# Patient Record
Sex: MALE | Race: WHITE
[De-identification: names, ages, dates, MRNs, and addresses within clinical notes are randomized per-mention and may not be internally consistent; named-entity substitution may affect disease eponyms.]

---

## 2019-06-14 NOTE — NUR
PACU RN | PATIENT TRANSFERRED TO SURGICAL FLOOR
 
This RN took pt to surgical floor. Helped get patient settled into room.
Patient was A/O. Requesting fluids. Denied pain. SCD's on. Polar pack in room.
Patient placed on 1 L NC. Sats > 92%. Advised Jody RN of TXA dose due now.
Helped prep TXA for infusion. Receiving RN denied further questions or needs.

## 2019-06-14 NOTE — NUR
INTO SDS VIA BED. PT REPORTS 2/10 LEFT HIP PAIN AT REST THAT INCREASED TO 6/10
WITH ANY MOVEMENT.  History, Chart, Medications and Allergies reviewed before
start of procedure.Lungs clear T/O to Auscultation.SATS>90% ON 2 LITERS NASAL
CANULA.  Patient confirms NPO status and agrees with scheduled surgery.NO PREP
TO LEFT HIP DUE TO PT PAIN.

## 2019-06-14 NOTE — NUR
SHIFT SUMMARY
PT NEW ADMIT THIS SHIFT. AAOX4. NPO. DISCOMFORT CONTROLLED WITH 1MG IV
DILAUDID. NO NAUSEA/EMESIS. PPP, PT DENIES N/T BLE, MOVES TOES WELL, BRISK CAP
REFILL. PT REPOSITION FOR COMFORT THIS SHIFT. QUESTIONS ANSWERED REGARDING TX.
ORIENTED TO ROOM + CALL LIGHT USE + PT DEMONSTRATED USE OF CALL LIGHT. ORTHO
CONSULT CALLED. PT RESTING THIS AM. WILL CONTINUE TO MONITOR.

## 2019-06-15 NOTE — NUR
SHIFT SUMMARY
PAIN HAS BEEN MANAGED WITH PO PAIN MEDICATION, PT REPORTS PAIN IS MINIMAL.  PT
WORKED WITH THERAPY TODAY.  POSSIBLE HOME TOMORROW.  CALCIUM WAS LOW TODAY AND
WAS REPLACED.  VSS.  WILL MONITOR UNTIL REPORT TO ONCOMING RN.

## 2019-06-15 NOTE — NUR
SUMMARY
POD #1
AQUACEL DRSG REMAINS C/D/I. CIRC WNL. PT HAS BEEN UP TO THE BATHROOM X1 WITH 1
PERSON ASSIST, FWW, & GAIT BELT. PAIN MANAGED WITH TYLENOL & TORADOL.
TOLERATING PO INTAKE. LR INFUSING @ 125 ML/HR. REPORT GIVEN TO DAY RN. CALL
LIGHT IN REACH.

## 2019-06-16 NOTE — NUR
SHIFT SUMMARY
 
RESTED WELL, ABLE TO REPOSITION SELF IN BED.  DENEIS PAIN OR DISCOMFORT AT
THIS TIME.  DRESSING TO LEFT HIP IS C/D/I.  DENIES FURTHER NEEDS AT THIS TIME.
SAFETY MEASURES IN PLACE.  WILL CONTINUE TO MONITOR.

## 2019-06-16 NOTE — NUR
DISCHARGE
PT PROVIDED WITH WRITTEN AND VERBAL DISCHARGE INSTRUCTIONS.  PT REPORTED
UNDERSTANDING AFTER QUESTIONS WERE ANSWERED.  PT ESCORTED OUT IN W/C BY ANABELL MCKINLEY.  WILL CONTINUE TO MONITOR.

## 2020-03-16 ENCOUNTER — HOSPITAL ENCOUNTER (OUTPATIENT)
Dept: HOSPITAL 95 - LAB | Age: 75
End: 2020-03-16
Attending: INTERNAL MEDICINE
Payer: MEDICARE

## 2020-03-16 DIAGNOSIS — I10: Primary | ICD-10-CM

## 2020-03-16 DIAGNOSIS — N20.0: ICD-10-CM

## 2020-03-16 LAB
CREAT UR-MCNC: 119 MG/DL (ref 27–270)
PROT UR-MCNC: 16 MG/DL (ref 0–11.9)
RBC #/AREA URNS HPF: (no result) /HPF (ref 0–2)
WBC #/AREA URNS HPF: (no result) /HPF (ref 0–5)

## 2020-12-09 ENCOUNTER — HOSPITAL ENCOUNTER (EMERGENCY)
Dept: HOSPITAL 95 - ER | Age: 75
Discharge: HOME | End: 2020-12-09
Payer: MEDICARE

## 2020-12-09 VITALS — BODY MASS INDEX: 25.2 KG/M2 | WEIGHT: 180.01 LBS | HEIGHT: 71 IN

## 2020-12-09 DIAGNOSIS — Z79.82: ICD-10-CM

## 2020-12-09 DIAGNOSIS — Z79.899: ICD-10-CM

## 2020-12-09 DIAGNOSIS — E86.0: Primary | ICD-10-CM

## 2020-12-09 DIAGNOSIS — Z87.891: ICD-10-CM

## 2020-12-09 DIAGNOSIS — Z88.5: ICD-10-CM

## 2020-12-09 DIAGNOSIS — Z86.73: ICD-10-CM

## 2020-12-09 DIAGNOSIS — I10: ICD-10-CM

## 2020-12-09 DIAGNOSIS — Z79.01: ICD-10-CM

## 2020-12-09 LAB — PROTHROMBIN TIME: 18.8 SEC (ref 9.7–11.5)

## 2020-12-10 ENCOUNTER — HOSPITAL ENCOUNTER (EMERGENCY)
Dept: HOSPITAL 95 - ER | Age: 75
Discharge: HOME | End: 2020-12-10
Payer: MEDICARE

## 2020-12-10 VITALS — HEIGHT: 71 IN | BODY MASS INDEX: 25.2 KG/M2 | WEIGHT: 180.01 LBS

## 2020-12-10 DIAGNOSIS — Z86.718: ICD-10-CM

## 2020-12-10 DIAGNOSIS — I10: ICD-10-CM

## 2020-12-10 DIAGNOSIS — Z79.899: ICD-10-CM

## 2020-12-10 DIAGNOSIS — C85.91: Primary | ICD-10-CM

## 2020-12-10 DIAGNOSIS — Z88.5: ICD-10-CM

## 2020-12-10 DIAGNOSIS — Z79.01: ICD-10-CM

## 2020-12-10 DIAGNOSIS — R13.10: ICD-10-CM

## 2020-12-10 DIAGNOSIS — E86.0: ICD-10-CM

## 2020-12-10 LAB
ANION GAP SERPL CALCULATED.4IONS-SCNC: 5 MMOL/L (ref 6–16)
BUN SERPL-MCNC: 18 MG/DL (ref 8–24)
CALCIUM SERPL-MCNC: 8.2 MG/DL (ref 8.5–10.1)
CHLORIDE SERPL-SCNC: 107 MMOL/L (ref 98–108)
CO2 SERPL-SCNC: 27 MMOL/L (ref 21–32)
CREAT SERPL-MCNC: 1.12 MG/DL (ref 0.6–1.2)
GLUCOSE SERPL-MCNC: 101 MG/DL (ref 70–99)
POTASSIUM SERPL-SCNC: 4.4 MMOL/L (ref 3.5–5.5)
PROTHROMBIN TIME: 20.7 SEC (ref 9.7–11.5)
SODIUM SERPL-SCNC: 139 MMOL/L (ref 136–145)

## 2021-01-04 ENCOUNTER — HOSPITAL ENCOUNTER (OUTPATIENT)
Dept: HOSPITAL 95 - LAB SHORT | Age: 76
End: 2021-01-04
Payer: MEDICARE

## 2021-01-04 DIAGNOSIS — L08.9: ICD-10-CM

## 2021-01-04 DIAGNOSIS — R23.3: ICD-10-CM

## 2021-01-04 DIAGNOSIS — D48.5: Primary | ICD-10-CM

## 2021-01-11 ENCOUNTER — HOSPITAL ENCOUNTER (OUTPATIENT)
Dept: HOSPITAL 95 - LAB SHORT | Age: 76
End: 2021-01-11
Attending: PATHOLOGY
Payer: MEDICARE

## 2021-01-11 DIAGNOSIS — C83.33: Primary | ICD-10-CM

## 2021-01-23 ENCOUNTER — HOSPITAL ENCOUNTER (EMERGENCY)
Dept: HOSPITAL 95 - ER | Age: 76
Discharge: HOME | End: 2021-01-23
Payer: MEDICARE

## 2021-01-23 VITALS — BODY MASS INDEX: 23.1 KG/M2 | WEIGHT: 164.99 LBS | HEIGHT: 71 IN

## 2021-01-23 DIAGNOSIS — R55: Primary | ICD-10-CM

## 2021-01-23 DIAGNOSIS — Z79.01: ICD-10-CM

## 2021-01-23 DIAGNOSIS — Z79.899: ICD-10-CM

## 2021-01-23 LAB
ALBUMIN SERPL BCP-MCNC: 3.5 G/DL (ref 3.4–5)
ALBUMIN/GLOB SERPL: 1 {RATIO} (ref 0.8–1.8)
ALT SERPL W P-5'-P-CCNC: 28 U/L (ref 12–78)
ANION GAP SERPL CALCULATED.4IONS-SCNC: 6 MMOL/L (ref 6–16)
AST SERPL W P-5'-P-CCNC: 26 U/L (ref 12–37)
BASOPHILS # BLD AUTO: 0.03 K/MM3 (ref 0–0.23)
BASOPHILS NFR BLD AUTO: 1 % (ref 0–2)
BILIRUB SERPL-MCNC: 1.2 MG/DL (ref 0.1–1)
BUN SERPL-MCNC: 30 MG/DL (ref 8–24)
CALCIUM SERPL-MCNC: 8.2 MG/DL (ref 8.5–10.1)
CHLORIDE SERPL-SCNC: 103 MMOL/L (ref 98–108)
CO2 SERPL-SCNC: 28 MMOL/L (ref 21–32)
CREAT SERPL-MCNC: 1.75 MG/DL (ref 0.6–1.2)
DEPRECATED RDW RBC AUTO: 43.3 FL (ref 35.1–46.3)
EOSINOPHIL # BLD AUTO: 0 K/MM3 (ref 0–0.68)
EOSINOPHIL NFR BLD AUTO: 0 % (ref 0–6)
ERYTHROCYTE [DISTWIDTH] IN BLOOD BY AUTOMATED COUNT: 13.1 % (ref 11.7–14.2)
GLOBULIN SER CALC-MCNC: 3.6 G/DL (ref 2.2–4)
GLUCOSE SERPL-MCNC: 104 MG/DL (ref 70–99)
HCT VFR BLD AUTO: 39.1 % (ref 37–53)
HGB BLD-MCNC: 13 G/DL (ref 13.5–17.5)
IMM GRANULOCYTES # BLD AUTO: 0.01 K/MM3 (ref 0–0.1)
IMM GRANULOCYTES NFR BLD AUTO: 0 % (ref 0–1)
LYMPHOCYTES # BLD AUTO: 0.25 K/MM3 (ref 0.84–5.2)
LYMPHOCYTES NFR BLD AUTO: 6 % (ref 21–46)
MAGNESIUM SERPL-MCNC: 2.1 MG/DL (ref 1.6–2.4)
MCHC RBC AUTO-ENTMCNC: 33.2 G/DL (ref 31.5–36.5)
MCV RBC AUTO: 91 FL (ref 80–100)
MONOCYTES # BLD AUTO: 0.44 K/MM3 (ref 0.16–1.47)
MONOCYTES NFR BLD AUTO: 10 % (ref 4–13)
NEUTROPHILS # BLD AUTO: 3.54 K/MM3 (ref 1.96–9.15)
NEUTROPHILS NFR BLD AUTO: 83 % (ref 41–73)
NRBC # BLD AUTO: 0 K/MM3 (ref 0–0.02)
NRBC BLD AUTO-RTO: 0 /100 WBC (ref 0–0.2)
PLATELET # BLD AUTO: 178 K/MM3 (ref 150–400)
POTASSIUM SERPL-SCNC: 4.7 MMOL/L (ref 3.5–5.5)
PROT SERPL-MCNC: 7.1 G/DL (ref 6.4–8.2)
PROTHROMBIN TIME: 21.9 SEC (ref 9.7–11.5)
SODIUM SERPL-SCNC: 137 MMOL/L (ref 136–145)
TROPONIN I SERPL-MCNC: <0.015 NG/ML (ref 0–0.04)

## 2021-01-28 ENCOUNTER — HOSPITAL ENCOUNTER (INPATIENT)
Dept: HOSPITAL 95 - ER | Age: 76
LOS: 3 days | Discharge: HOME | DRG: 193 | End: 2021-01-31
Attending: HOSPITALIST | Admitting: HOSPITALIST
Payer: MEDICARE

## 2021-01-28 VITALS — WEIGHT: 163.36 LBS | BODY MASS INDEX: 22.87 KG/M2 | HEIGHT: 70.98 IN

## 2021-01-28 DIAGNOSIS — Z86.718: ICD-10-CM

## 2021-01-28 DIAGNOSIS — Z87.891: ICD-10-CM

## 2021-01-28 DIAGNOSIS — Z79.899: ICD-10-CM

## 2021-01-28 DIAGNOSIS — J96.01: ICD-10-CM

## 2021-01-28 DIAGNOSIS — N18.30: ICD-10-CM

## 2021-01-28 DIAGNOSIS — J18.9: Primary | ICD-10-CM

## 2021-01-28 DIAGNOSIS — M48.54XD: ICD-10-CM

## 2021-01-28 DIAGNOSIS — N17.9: ICD-10-CM

## 2021-01-28 DIAGNOSIS — I12.9: ICD-10-CM

## 2021-01-28 DIAGNOSIS — C83.38: ICD-10-CM

## 2021-01-28 DIAGNOSIS — L40.9: ICD-10-CM

## 2021-01-28 DIAGNOSIS — Z20.822: ICD-10-CM

## 2021-01-28 DIAGNOSIS — R13.10: ICD-10-CM

## 2021-01-28 DIAGNOSIS — Z79.01: ICD-10-CM

## 2021-01-28 DIAGNOSIS — R55: ICD-10-CM

## 2021-01-28 LAB
ALBUMIN SERPL BCP-MCNC: 3 G/DL (ref 3.4–5)
ALBUMIN/GLOB SERPL: 0.8 {RATIO} (ref 0.8–1.8)
ALT SERPL W P-5'-P-CCNC: 36 U/L (ref 12–78)
ANION GAP SERPL CALCULATED.4IONS-SCNC: 7 MMOL/L (ref 6–16)
AST SERPL W P-5'-P-CCNC: 48 U/L (ref 12–37)
BASOPHILS # BLD AUTO: 0.02 K/MM3 (ref 0–0.23)
BASOPHILS NFR BLD AUTO: 0 % (ref 0–2)
BILIRUB SERPL-MCNC: 1 MG/DL (ref 0.1–1)
BUN SERPL-MCNC: 32 MG/DL (ref 8–24)
CALCIUM SERPL-MCNC: 8.7 MG/DL (ref 8.5–10.1)
CHLORIDE SERPL-SCNC: 102 MMOL/L (ref 98–108)
CO2 SERPL-SCNC: 27 MMOL/L (ref 21–32)
CREAT SERPL-MCNC: 1.57 MG/DL (ref 0.6–1.2)
DEPRECATED RDW RBC AUTO: 42.2 FL (ref 35.1–46.3)
EOSINOPHIL # BLD AUTO: 0 K/MM3 (ref 0–0.68)
EOSINOPHIL NFR BLD AUTO: 0 % (ref 0–6)
ERYTHROCYTE [DISTWIDTH] IN BLOOD BY AUTOMATED COUNT: 12.9 % (ref 11.7–14.2)
GLOBULIN SER CALC-MCNC: 3.6 G/DL (ref 2.2–4)
GLUCOSE SERPL-MCNC: 126 MG/DL (ref 70–99)
HCT VFR BLD AUTO: 39.2 % (ref 37–53)
HGB BLD-MCNC: 13.6 G/DL (ref 13.5–17.5)
IMM GRANULOCYTES # BLD AUTO: 0.02 K/MM3 (ref 0–0.1)
IMM GRANULOCYTES NFR BLD AUTO: 0 % (ref 0–1)
LYMPHOCYTES # BLD AUTO: 0.38 K/MM3 (ref 0.84–5.2)
LYMPHOCYTES NFR BLD AUTO: 9 % (ref 21–46)
MCHC RBC AUTO-ENTMCNC: 34.7 G/DL (ref 31.5–36.5)
MCV RBC AUTO: 89 FL (ref 80–100)
MONOCYTES # BLD AUTO: 0.72 K/MM3 (ref 0.16–1.47)
MONOCYTES NFR BLD AUTO: 16 % (ref 4–13)
NEUTROPHILS # BLD AUTO: 3.34 K/MM3 (ref 1.96–9.15)
NEUTROPHILS NFR BLD AUTO: 75 % (ref 41–73)
NRBC # BLD AUTO: 0 K/MM3 (ref 0–0.02)
NRBC BLD AUTO-RTO: 0 /100 WBC (ref 0–0.2)
PLATELET # BLD AUTO: 186 K/MM3 (ref 150–400)
POTASSIUM SERPL-SCNC: 4.1 MMOL/L (ref 3.5–5.5)
PROT SERPL-MCNC: 6.6 G/DL (ref 6.4–8.2)
PROTHROMBIN TIME: 14.4 SEC (ref 9.7–11.5)
SODIUM SERPL-SCNC: 136 MMOL/L (ref 136–145)
TROPONIN I SERPL-MCNC: <0.015 NG/ML (ref 0–0.04)

## 2021-01-28 PROCEDURE — A9270 NON-COVERED ITEM OR SERVICE: HCPCS

## 2021-01-28 NOTE — NUR
SHIFT SUMMARY:
 
ASSUMED CARE OF PATIENT AT 1704 UPON HIS ARRIVAL FROM ED. ABLE TO STAND AND
TRANSFER TO BED. RECEIVING OXYGEN AT 3 L/MIN NC, DOES NOT USE O2 AT HOME;
DYSPNEIC AT REST. DENIES PAIN AT THIS TIME. HAS DIFFICULTY SWALLOWING AND POOR
APPETITE WITH > 20 LBS WEIGHT LOSS IN THE LAST TWO MONTHS. PLACED TELEMETRY,
SR 95. IVF INFUSING. CARDIAC ECHO COMPLETED.

## 2021-01-29 LAB
ANION GAP SERPL CALCULATED.4IONS-SCNC: 5 MMOL/L (ref 6–16)
BUN SERPL-MCNC: 26 MG/DL (ref 8–24)
CALCIUM SERPL-MCNC: 8 MG/DL (ref 8.5–10.1)
CHLORIDE SERPL-SCNC: 105 MMOL/L (ref 98–108)
CO2 SERPL-SCNC: 29 MMOL/L (ref 21–32)
CREAT SERPL-MCNC: 1.41 MG/DL (ref 0.6–1.2)
GLUCOSE SERPL-MCNC: 107 MG/DL (ref 70–99)
POTASSIUM SERPL-SCNC: 4.5 MMOL/L (ref 3.5–5.5)
PROTHROMBIN TIME: 16.6 SEC (ref 9.7–11.5)
SODIUM SERPL-SCNC: 139 MMOL/L (ref 136–145)

## 2021-01-29 NOTE — NUR
SHIFT SUMMARY:
 
NO ACUTE EVENTS THIS SHIFT. NO EVENTS ON TELEMETRY. O2 @ 2 L/MIN NC, DYSPNEIC
AT REST. DENIES PAIN. DIET CHANGED TO SOFT BITE SIZE, TOLERATING. DRESSING ON
R LOWER ABDOMEN CD&I. CHANGES POSITION INDPENEDENTLY. WIFE VISITED TODAY.

## 2021-01-29 NOTE — NUR
NIGHT SHIFT SUMMARY
NO ACUTE CHANGES THIS SHIFT. PT AAOX4 AND VERY PLEASANT. STARTED SHIFT ON 3L
O2 VIA NC BUT WAS SATTING IN THE HIGH 90'S SO TITRATED DOWN TO 2L. MAINTAINING
SATS MID 90'S AT THIS TIME. PT HAS LARGE WOUND ON R ABD FROM A POSSIBLE CANCER
HE WAS REMOVED AND BIOPSIED A COUPLE OF WEEKS AGO. PT STATES HE GOES TO A
CLINIC AND HAS DRESSING CHANGED EVERY 2-3 DAYS. PT STATES IT MAY BE A
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 DENIES PAIN OF AREA. PHOTO PLACED IN
CHART. PT HAS USED URINAL IN BED WITH NO ISSUES. VSS, WILL CONTINUE TO
MONITOR.

## 2021-01-30 LAB
ANION GAP SERPL CALCULATED.4IONS-SCNC: 5 MMOL/L (ref 6–16)
BASOPHILS # BLD AUTO: 0.02 K/MM3 (ref 0–0.23)
BASOPHILS NFR BLD AUTO: 1 % (ref 0–2)
BUN SERPL-MCNC: 23 MG/DL (ref 8–24)
CALCIUM SERPL-MCNC: 8.2 MG/DL (ref 8.5–10.1)
CHLORIDE SERPL-SCNC: 105 MMOL/L (ref 98–108)
CO2 SERPL-SCNC: 28 MMOL/L (ref 21–32)
CREAT SERPL-MCNC: 1.3 MG/DL (ref 0.6–1.2)
DEPRECATED RDW RBC AUTO: 42.6 FL (ref 35.1–46.3)
EOSINOPHIL # BLD AUTO: 0.01 K/MM3 (ref 0–0.68)
EOSINOPHIL NFR BLD AUTO: 0 % (ref 0–6)
ERYTHROCYTE [DISTWIDTH] IN BLOOD BY AUTOMATED COUNT: 13 % (ref 11.7–14.2)
GLUCOSE SERPL-MCNC: 110 MG/DL (ref 70–99)
HCT VFR BLD AUTO: 33.6 % (ref 37–53)
HGB BLD-MCNC: 11.1 G/DL (ref 13.5–17.5)
IMM GRANULOCYTES # BLD AUTO: 0.01 K/MM3 (ref 0–0.1)
IMM GRANULOCYTES NFR BLD AUTO: 0 % (ref 0–1)
LYMPHOCYTES # BLD AUTO: 0.36 K/MM3 (ref 0.84–5.2)
LYMPHOCYTES NFR BLD AUTO: 12 % (ref 21–46)
MCHC RBC AUTO-ENTMCNC: 33 G/DL (ref 31.5–36.5)
MCV RBC AUTO: 89 FL (ref 80–100)
MONOCYTES # BLD AUTO: 0.65 K/MM3 (ref 0.16–1.47)
MONOCYTES NFR BLD AUTO: 22 % (ref 4–13)
NEUTROPHILS # BLD AUTO: 1.97 K/MM3 (ref 1.96–9.15)
NEUTROPHILS NFR BLD AUTO: 65 % (ref 41–73)
NRBC # BLD AUTO: 0 K/MM3 (ref 0–0.02)
NRBC BLD AUTO-RTO: 0 /100 WBC (ref 0–0.2)
PLATELET # BLD AUTO: 149 K/MM3 (ref 150–400)
POTASSIUM SERPL-SCNC: 4.4 MMOL/L (ref 3.5–5.5)
PROTHROMBIN TIME: 20.6 SEC (ref 9.7–11.5)
SODIUM SERPL-SCNC: 138 MMOL/L (ref 136–145)

## 2021-01-30 NOTE — NUR
Shift Summary
A/Ox3, pleasant and cooperative with care. SBA, uses bedside commode and
urinal independently. Dyspnea with exertion. Home O2 eval completed. Currently
on 2L O2 continuous. Tele: SR 90. Denies any pain, nausea, vomiting. No acute
changes. Patient states ready to go home. WCTM.

## 2021-01-30 NOTE — NUR
NIGHT SHIFT SUMMARY
NO ACUTE CHANGES THIS SHIFT. PT AAOX4 AND PLEASANT. ABLE TO MAKE NEEDS KNOWN
AND CALLS APPROPRIATELY FOR ASSISTANCE. REMAINS ON 2L O2 VIA NC. PT STATES
RESPIRATORY STATUS IS IMPROVED FROM WHEN HE WAS FIRST ADMITTED. PT REPORTS
BEING EAGER TO BEGIN TREATMENT FOR THE POSSIBLE RECURRENCE OF HIS CANCER THAT
WAS BIOPSIED A FEW DAYS AGO. PT DENIES PAIN AND HAS HAD NO COMPLAINTS THROUGH
THE NIGHT. VSS, WILL CONTINUE TO MONITOR.

## 2021-01-31 LAB — PROTHROMBIN TIME: 21.2 SEC (ref 9.7–11.5)

## 2021-01-31 NOTE — NUR
SHIFT SUMMARY
 
HAS BEEN RESTING QUIETLY WITH FEW INTERRUPTIONS THIS SHIFT. ALERT AND ORIENTED
CALL LIGHT IN REACH. UP AND AD PRITESH WITH STAND BY ASSIST. NO NOTED ACUTE
DISTRESS.

## 2021-01-31 NOTE — NUR
Discharge Summary
Patient discharging to home. Reviewed discharge paperwork with patient, copy
provided. Meds faxed to [facility] per patient request. [facility] [place]
delivered portable oxygen tank to patient.  will need to fax oxygen orders
to [facility] [place] tomorrow. IV removed. Personal belongings will be sent home.
Escorted by CNA via w/c, personal transport home.

## 2021-02-23 ENCOUNTER — HOSPITAL ENCOUNTER (OUTPATIENT)
Dept: HOSPITAL 95 - ORD | Age: 76
Discharge: HOME | End: 2021-02-23
Attending: SURGERY
Payer: MEDICARE

## 2021-02-23 VITALS — WEIGHT: 151.46 LBS | BODY MASS INDEX: 21.2 KG/M2 | HEIGHT: 70.98 IN

## 2021-02-23 DIAGNOSIS — C85.10: Primary | ICD-10-CM

## 2021-02-23 DIAGNOSIS — Z79.01: ICD-10-CM

## 2021-02-23 DIAGNOSIS — Z86.718: ICD-10-CM

## 2021-02-23 DIAGNOSIS — E78.5: ICD-10-CM

## 2021-02-23 DIAGNOSIS — I10: ICD-10-CM

## 2021-02-23 DIAGNOSIS — I73.9: ICD-10-CM

## 2021-02-23 DIAGNOSIS — Z86.73: ICD-10-CM

## 2021-02-23 DIAGNOSIS — Z79.899: ICD-10-CM

## 2021-02-23 PROCEDURE — 0JH60WZ INSERTION OF TOTALLY IMPLANTABLE VASCULAR ACCESS DEVICE INTO CHEST SUBCUTANEOUS TISSUE AND FASCIA, OPEN APPROACH: ICD-10-PCS | Performed by: SURGERY

## 2021-02-23 PROCEDURE — C1788 PORT, INDWELLING, IMP: HCPCS

## 2021-02-23 NOTE — NUR
History, Chart, Medications and Allergies reviewed before start of
procedure. Lungs clear T/O to Auscultation.
Patient confirms NPO status and agrees with scheduled surgery.
Pre-Op teaching done. Pt verbalizes understanding.
Patient States Post-Procedure ride home has been arranged.
Patient reports completing Chlorhexadine shower X2 prior to admission to
hospital.

## 2021-02-23 NOTE — NUR
Patient up to Ambulate independently. Gait steady.
Discharge instructions reviewed with patient. Patient verbalizes understanding.
Copy given to patient to take home.
Dressing to procedure site clean, dry, intact with no visible
drainage, swelling, erythema or bruising noted.
Patient States Post-Procedure ride home has been arranged.
Discharged via wheelchair to private car for ride home. ALL BELONINGS SENT
HOME WITH [facility]

## 2021-02-23 NOTE — NUR
Dressing to procedure site clean, dry, intact with no visible
drainage, swelling, erythema or bruising noted. PT DENIES C/O OR CONCERNS.
PROVIDED WARM WATER PER P[T REQUEST

## 2021-02-23 NOTE — NUR
PT REPORT AND ASSUMED CARE FROM DENIS HUBBARD RN. DR DAVEY IN TO CONSULT
WITH PATIENT. PT DOES NOT WANT TO BE GIVEN VERSED PRIOR TO GOING TO THE OR.

## 2021-02-25 ENCOUNTER — HOSPITAL ENCOUNTER (OUTPATIENT)
Dept: HOSPITAL 95 - LAB | Age: 76
Discharge: HOME | End: 2021-02-25
Attending: INTERNAL MEDICINE
Payer: MEDICARE

## 2021-02-25 DIAGNOSIS — R22.9: ICD-10-CM

## 2021-02-25 DIAGNOSIS — C85.10: Primary | ICD-10-CM

## 2021-02-25 LAB
ALBUMIN SERPL BCP-MCNC: 2.9 G/DL (ref 3.4–5)
ALBUMIN/GLOB SERPL: 0.9 {RATIO} (ref 0.8–1.8)
ALT SERPL W P-5'-P-CCNC: 26 U/L (ref 12–78)
ANION GAP SERPL CALCULATED.4IONS-SCNC: 5 MMOL/L (ref 6–16)
AST SERPL W P-5'-P-CCNC: 22 U/L (ref 12–37)
BILIRUB SERPL-MCNC: 0.5 MG/DL (ref 0.1–1)
BUN SERPL-MCNC: 24 MG/DL (ref 8–24)
CALCIUM SERPL-MCNC: 8.7 MG/DL (ref 8.5–10.1)
CHLORIDE SERPL-SCNC: 107 MMOL/L (ref 98–108)
CO2 SERPL-SCNC: 30 MMOL/L (ref 21–32)
CREAT SERPL-MCNC: 1.09 MG/DL (ref 0.6–1.2)
GLOBULIN SER CALC-MCNC: 3.1 G/DL (ref 2.2–4)
GLUCOSE SERPL-MCNC: 113 MG/DL (ref 70–99)
LDH SERPL-CCNC: 198 U/L (ref 100–240)
PHOSPHATE SERPL-MCNC: 3.2 MG/DL (ref 2.5–4.9)
POTASSIUM SERPL-SCNC: 3.8 MMOL/L (ref 3.5–5.5)
PROT SERPL-MCNC: 6 G/DL (ref 6.4–8.2)
SODIUM SERPL-SCNC: 142 MMOL/L (ref 136–145)

## 2021-03-04 ENCOUNTER — HOSPITAL ENCOUNTER (OUTPATIENT)
Dept: HOSPITAL 95 - LAB | Age: 76
Discharge: HOME | End: 2021-03-04
Attending: INTERNAL MEDICINE
Payer: MEDICARE

## 2021-03-04 DIAGNOSIS — C85.10: Primary | ICD-10-CM

## 2021-03-04 LAB
ALBUMIN SERPL BCP-MCNC: 3.3 G/DL (ref 3.4–5)
ALBUMIN/GLOB SERPL: 1.2 {RATIO} (ref 0.8–1.8)
ALT SERPL W P-5'-P-CCNC: 31 U/L (ref 12–78)
ANION GAP SERPL CALCULATED.4IONS-SCNC: 4 MMOL/L (ref 6–16)
AST SERPL W P-5'-P-CCNC: 14 U/L (ref 12–37)
BILIRUB SERPL-MCNC: 0.7 MG/DL (ref 0.1–1)
BUN SERPL-MCNC: 22 MG/DL (ref 8–24)
CALCIUM SERPL-MCNC: 8.6 MG/DL (ref 8.5–10.1)
CHLORIDE SERPL-SCNC: 104 MMOL/L (ref 98–108)
CO2 SERPL-SCNC: 28 MMOL/L (ref 21–32)
CREAT SERPL-MCNC: 0.83 MG/DL (ref 0.6–1.2)
GLOBULIN SER CALC-MCNC: 2.8 G/DL (ref 2.2–4)
GLUCOSE SERPL-MCNC: 98 MG/DL (ref 70–99)
LDH SERPL-CCNC: 240 U/L (ref 100–240)
MAGNESIUM SERPL-MCNC: 2.1 MG/DL (ref 1.6–2.4)
PHOSPHATE SERPL-MCNC: 2.9 MG/DL (ref 2.5–4.9)
POTASSIUM SERPL-SCNC: 4.4 MMOL/L (ref 3.5–5.5)
PROT SERPL-MCNC: 6.1 G/DL (ref 6.4–8.2)
SODIUM SERPL-SCNC: 136 MMOL/L (ref 136–145)

## 2021-03-08 ENCOUNTER — HOSPITAL ENCOUNTER (OUTPATIENT)
Dept: HOSPITAL 95 - LAB SHORT | Age: 76
Discharge: HOME | End: 2021-03-08
Attending: INTERNAL MEDICINE
Payer: MEDICARE

## 2021-03-08 DIAGNOSIS — C85.10: Primary | ICD-10-CM

## 2021-03-08 LAB
ALBUMIN SERPL BCP-MCNC: 3.3 G/DL (ref 3.4–5)
ALBUMIN/GLOB SERPL: 1.1 {RATIO} (ref 0.8–1.8)
ALT SERPL W P-5'-P-CCNC: 22 U/L (ref 12–78)
ANION GAP SERPL CALCULATED.4IONS-SCNC: 5 MMOL/L (ref 6–16)
AST SERPL W P-5'-P-CCNC: 17 U/L (ref 12–37)
BASOPHILS # BLD: 0 K/MM3 (ref 0–0.23)
BASOPHILS NFR BLD: 0 % (ref 0–2)
BILIRUB SERPL-MCNC: 0.4 MG/DL (ref 0.1–1)
BUN SERPL-MCNC: 13 MG/DL (ref 8–24)
CALCIUM SERPL-MCNC: 8.6 MG/DL (ref 8.5–10.1)
CHLORIDE SERPL-SCNC: 105 MMOL/L (ref 98–108)
CO2 SERPL-SCNC: 30 MMOL/L (ref 21–32)
CREAT SERPL-MCNC: 1.05 MG/DL (ref 0.6–1.2)
DEPRECATED RDW RBC AUTO: 54.4 FL (ref 35.1–46.3)
EOSINOPHIL # BLD: 0.11 K/MM3 (ref 0–0.68)
EOSINOPHIL NFR BLD: 1 % (ref 0–6)
ERYTHROCYTE [DISTWIDTH] IN BLOOD BY AUTOMATED COUNT: 17.8 % (ref 11.7–14.2)
GLOBULIN SER CALC-MCNC: 2.9 G/DL (ref 2.2–4)
GLUCOSE SERPL-MCNC: 101 MG/DL (ref 70–99)
HCT VFR BLD AUTO: 32.9 % (ref 37–53)
HGB BLD-MCNC: 10.4 G/DL (ref 13.5–17.5)
LYMPHOCYTES # BLD: 0.22 K/MM3 (ref 0.84–5.2)
LYMPHOCYTES NFR BLD: 2 % (ref 21–46)
MCHC RBC AUTO-ENTMCNC: 31.6 G/DL (ref 31.5–36.5)
MCV RBC AUTO: 92 FL (ref 80–100)
METAMYELOCYTES # BLD MANUAL: 0.22 K/MM3 (ref 0–0)
METAMYELOCYTES NFR BLD MANUAL: 2 % (ref 0–0)
MONOCYTES # BLD: 1.23 K/MM3 (ref 0.16–1.47)
MONOCYTES NFR BLD: 11 % (ref 4–13)
NEUTS BAND NFR BLD MANUAL: 6 % (ref 0–8)
NEUTS SEG # BLD MANUAL: 9.43 K/MM3 (ref 1.96–9.15)
NEUTS SEG NFR BLD MANUAL: 78 % (ref 41–73)
NRBC # BLD AUTO: 0.07 K/MM3 (ref 0–0.02)
NRBC BLD AUTO-RTO: 0.6 /100 WBC (ref 0–0.2)
PHOSPHATE SERPL-MCNC: 3.5 MG/DL (ref 2.5–4.9)
PLATELET # BLD AUTO: 201 K/MM3 (ref 150–400)
POTASSIUM SERPL-SCNC: 4 MMOL/L (ref 3.5–5.5)
PROT SERPL-MCNC: 6.2 G/DL (ref 6.4–8.2)
SODIUM SERPL-SCNC: 140 MMOL/L (ref 136–145)
TOTAL CELLS COUNTED BLD: 100

## 2021-04-28 ENCOUNTER — HOSPITAL ENCOUNTER (OUTPATIENT)
Dept: HOSPITAL 95 - LAB SHORT | Age: 76
Discharge: HOME | End: 2021-04-28
Attending: INTERNAL MEDICINE
Payer: MEDICARE

## 2021-04-28 DIAGNOSIS — C85.10: Primary | ICD-10-CM

## 2021-04-28 LAB
ALBUMIN SERPL BCP-MCNC: 3.7 G/DL (ref 3.4–5)
ALBUMIN/GLOB SERPL: 1.4 {RATIO} (ref 0.8–1.8)
ALT SERPL W P-5'-P-CCNC: 15 U/L (ref 12–78)
ANION GAP SERPL CALCULATED.4IONS-SCNC: 6 MMOL/L (ref 6–16)
AST SERPL W P-5'-P-CCNC: 12 U/L (ref 12–37)
BILIRUB SERPL-MCNC: 0.4 MG/DL (ref 0.1–1)
BUN SERPL-MCNC: 22 MG/DL (ref 8–24)
CALCIUM SERPL-MCNC: 8.4 MG/DL (ref 8.5–10.1)
CHLORIDE SERPL-SCNC: 107 MMOL/L (ref 98–108)
CO2 SERPL-SCNC: 25 MMOL/L (ref 21–32)
CREAT SERPL-MCNC: 0.94 MG/DL (ref 0.6–1.2)
GLOBULIN SER CALC-MCNC: 2.7 G/DL (ref 2.2–4)
GLUCOSE SERPL-MCNC: 205 MG/DL (ref 70–99)
LDH SERPL-CCNC: 150 U/L (ref 100–240)
PHOSPHATE SERPL-MCNC: 2.3 MG/DL (ref 2.5–4.9)
POTASSIUM SERPL-SCNC: 4.2 MMOL/L (ref 3.5–5.5)
PROT SERPL-MCNC: 6.4 G/DL (ref 6.4–8.2)
SODIUM SERPL-SCNC: 138 MMOL/L (ref 136–145)

## 2021-05-16 ENCOUNTER — HOSPITAL ENCOUNTER (EMERGENCY)
Dept: HOSPITAL 95 - ER | Age: 76
Discharge: HOME | End: 2021-05-16
Payer: MEDICARE

## 2021-05-16 VITALS — HEIGHT: 71 IN | BODY MASS INDEX: 23.1 KG/M2 | WEIGHT: 164.99 LBS

## 2021-05-16 DIAGNOSIS — I82.431: Primary | ICD-10-CM

## 2021-05-16 DIAGNOSIS — Z79.01: ICD-10-CM

## 2021-05-16 DIAGNOSIS — Z79.899: ICD-10-CM

## 2021-05-16 DIAGNOSIS — R79.1: ICD-10-CM

## 2021-05-16 DIAGNOSIS — Z88.5: ICD-10-CM

## 2021-05-16 LAB
CA-I BLD-SCNC: 1.14 MMOL/L (ref 1.1–1.46)
CHLORIDE BLD-SCNC: 100 MMOL/L (ref 98–108)
CO2 BLD-SCNC: 30 MMOL/L (ref 21–32)
CREAT BLD-MCNC: 1 MG/DL (ref 0.8–1.3)
GLUCOSE BLDC GLUCOMTR-MCNC: 116 MG/DL (ref 70–99)
HCT VFR BLD CALC: 36 % (ref 41–53)
HGB BLD CALC-MCNC: 12.2 G/DL (ref 13.5–17.5)
POTASSIUM BLD-SCNC: 4.4 MMOL/L (ref 3.5–5.5)
PROTHROMBIN TIME: 17.6 SEC (ref 9.7–11.5)
SODIUM BLD-SCNC: 138 MMOL/L (ref 135–148)

## 2021-05-16 PROCEDURE — A9270 NON-COVERED ITEM OR SERVICE: HCPCS

## 2021-06-15 ENCOUNTER — HOSPITAL ENCOUNTER (OUTPATIENT)
Dept: HOSPITAL 95 - LAB | Age: 76
Discharge: HOME | End: 2021-06-15
Attending: INTERNAL MEDICINE
Payer: MEDICARE

## 2021-06-15 DIAGNOSIS — C85.10: Primary | ICD-10-CM

## 2021-06-15 LAB
ALBUMIN SERPL BCP-MCNC: 3.6 G/DL (ref 3.4–5)
ALBUMIN/GLOB SERPL: 1.3 {RATIO} (ref 0.8–1.8)
ALT SERPL W P-5'-P-CCNC: 36 U/L (ref 12–78)
ANION GAP SERPL CALCULATED.4IONS-SCNC: 5 MMOL/L (ref 6–16)
AST SERPL W P-5'-P-CCNC: 20 U/L (ref 12–37)
BASOPHILS # BLD AUTO: 0.08 K/MM3 (ref 0–0.23)
BASOPHILS NFR BLD AUTO: 1 % (ref 0–2)
BILIRUB SERPL-MCNC: 0.5 MG/DL (ref 0.1–1)
BUN SERPL-MCNC: 23 MG/DL (ref 8–24)
CALCIUM SERPL-MCNC: 8.5 MG/DL (ref 8.5–10.1)
CHLORIDE SERPL-SCNC: 106 MMOL/L (ref 98–108)
CO2 SERPL-SCNC: 30 MMOL/L (ref 21–32)
CREAT SERPL-MCNC: 1.01 MG/DL (ref 0.6–1.2)
DEPRECATED RDW RBC AUTO: 53.4 FL (ref 35.1–46.3)
EOSINOPHIL # BLD AUTO: 0.03 K/MM3 (ref 0–0.68)
EOSINOPHIL NFR BLD AUTO: 1 % (ref 0–6)
ERYTHROCYTE [DISTWIDTH] IN BLOOD BY AUTOMATED COUNT: 15.9 % (ref 11.7–14.2)
GLOBULIN SER CALC-MCNC: 2.7 G/DL (ref 2.2–4)
GLUCOSE SERPL-MCNC: 95 MG/DL (ref 70–99)
HCT VFR BLD AUTO: 35.6 % (ref 37–53)
HGB BLD-MCNC: 11.5 G/DL (ref 13.5–17.5)
IMM GRANULOCYTES # BLD AUTO: 0.27 K/MM3 (ref 0–0.1)
IMM GRANULOCYTES NFR BLD AUTO: 5 % (ref 0–1)
LYMPHOCYTES # BLD AUTO: 0.24 K/MM3 (ref 0.84–5.2)
LYMPHOCYTES NFR BLD AUTO: 4 % (ref 21–46)
MCHC RBC AUTO-ENTMCNC: 32.3 G/DL (ref 31.5–36.5)
MCV RBC AUTO: 94 FL (ref 80–100)
MONOCYTES # BLD AUTO: 0.46 K/MM3 (ref 0.16–1.47)
MONOCYTES NFR BLD AUTO: 8 % (ref 4–13)
NEUTROPHILS # BLD AUTO: 4.8 K/MM3 (ref 1.96–9.15)
NEUTROPHILS NFR BLD AUTO: 82 % (ref 41–73)
NRBC # BLD AUTO: 0 K/MM3 (ref 0–0.02)
NRBC BLD AUTO-RTO: 0 /100 WBC (ref 0–0.2)
PHOSPHATE SERPL-MCNC: 3.4 MG/DL (ref 2.5–4.9)
PLATELET # BLD AUTO: 231 K/MM3 (ref 150–400)
POTASSIUM SERPL-SCNC: 4.7 MMOL/L (ref 3.5–5.5)
PROT SERPL-MCNC: 6.3 G/DL (ref 6.4–8.2)
SODIUM SERPL-SCNC: 141 MMOL/L (ref 136–145)

## 2021-07-02 ENCOUNTER — HOSPITAL ENCOUNTER (EMERGENCY)
Dept: HOSPITAL 95 - ER | Age: 76
Discharge: HOME | End: 2021-07-02
Payer: MEDICARE

## 2021-07-02 VITALS — WEIGHT: 169.01 LBS | HEIGHT: 71 IN | BODY MASS INDEX: 23.66 KG/M2

## 2021-07-02 DIAGNOSIS — Z88.5: ICD-10-CM

## 2021-07-02 DIAGNOSIS — R53.1: Primary | ICD-10-CM

## 2021-07-02 DIAGNOSIS — Z79.899: ICD-10-CM

## 2021-07-02 DIAGNOSIS — Z87.891: ICD-10-CM

## 2021-07-02 DIAGNOSIS — I10: ICD-10-CM

## 2021-07-02 LAB
ALBUMIN SERPL BCP-MCNC: 3.4 G/DL (ref 3.4–5)
ALBUMIN/GLOB SERPL: 1.2 {RATIO} (ref 0.8–1.8)
ALT SERPL W P-5'-P-CCNC: 24 U/L (ref 12–78)
ANION GAP SERPL CALCULATED.4IONS-SCNC: 5 MMOL/L (ref 6–16)
AST SERPL W P-5'-P-CCNC: 16 U/L (ref 12–37)
BASOPHILS # BLD AUTO: 0.06 K/MM3 (ref 0–0.23)
BASOPHILS NFR BLD AUTO: 1 % (ref 0–2)
BILIRUB SERPL-MCNC: 0.4 MG/DL (ref 0.1–1)
BUN SERPL-MCNC: 27 MG/DL (ref 8–24)
CALCIUM SERPL-MCNC: 8.2 MG/DL (ref 8.5–10.1)
CHLORIDE SERPL-SCNC: 109 MMOL/L (ref 98–108)
CO2 SERPL-SCNC: 26 MMOL/L (ref 21–32)
CREAT SERPL-MCNC: 0.94 MG/DL (ref 0.6–1.2)
DEPRECATED RDW RBC AUTO: 52.8 FL (ref 35.1–46.3)
EOSINOPHIL # BLD AUTO: 0.08 K/MM3 (ref 0–0.68)
EOSINOPHIL NFR BLD AUTO: 1 % (ref 0–6)
ERYTHROCYTE [DISTWIDTH] IN BLOOD BY AUTOMATED COUNT: 15.9 % (ref 11.7–14.2)
GLOBULIN SER CALC-MCNC: 2.8 G/DL (ref 2.2–4)
GLUCOSE SERPL-MCNC: 99 MG/DL (ref 70–99)
HCT VFR BLD AUTO: 33.4 % (ref 37–53)
HGB BLD-MCNC: 11.2 G/DL (ref 13.5–17.5)
IMM GRANULOCYTES # BLD AUTO: 0.09 K/MM3 (ref 0–0.1)
IMM GRANULOCYTES NFR BLD AUTO: 2 % (ref 0–1)
LYMPHOCYTES # BLD AUTO: 0.26 K/MM3 (ref 0.84–5.2)
LYMPHOCYTES NFR BLD AUTO: 4 % (ref 21–46)
MCHC RBC AUTO-ENTMCNC: 33.5 G/DL (ref 31.5–36.5)
MCV RBC AUTO: 93 FL (ref 80–100)
MONOCYTES # BLD AUTO: 0.6 K/MM3 (ref 0.16–1.47)
MONOCYTES NFR BLD AUTO: 10 % (ref 4–13)
NEUTROPHILS # BLD AUTO: 5.08 K/MM3 (ref 1.96–9.15)
NEUTROPHILS NFR BLD AUTO: 82 % (ref 41–73)
NRBC # BLD AUTO: 0 K/MM3 (ref 0–0.02)
NRBC BLD AUTO-RTO: 0 /100 WBC (ref 0–0.2)
PLATELET # BLD AUTO: 208 K/MM3 (ref 150–400)
POTASSIUM SERPL-SCNC: 4.7 MMOL/L (ref 3.5–5.5)
PROT SERPL-MCNC: 6.2 G/DL (ref 6.4–8.2)
PROTHROMBIN TIME: 27.7 SEC (ref 9.7–11.5)
SODIUM SERPL-SCNC: 140 MMOL/L (ref 136–145)
SP GR SPEC: 1.02 (ref 1–1.02)
UROBILINOGEN UR STRIP-MCNC: (no result) MG/DL

## 2021-10-20 ENCOUNTER — HOSPITAL ENCOUNTER (EMERGENCY)
Dept: HOSPITAL 95 - ER | Age: 76
Discharge: HOME | End: 2021-10-20
Payer: MEDICARE

## 2021-10-20 VITALS — WEIGHT: 184.99 LBS | HEIGHT: 71 IN | BODY MASS INDEX: 25.9 KG/M2

## 2021-10-20 DIAGNOSIS — R79.1: ICD-10-CM

## 2021-10-20 DIAGNOSIS — Z79.899: ICD-10-CM

## 2021-10-20 DIAGNOSIS — E78.5: ICD-10-CM

## 2021-10-20 DIAGNOSIS — U07.1: Primary | ICD-10-CM

## 2021-10-20 DIAGNOSIS — Z86.718: ICD-10-CM

## 2021-10-20 DIAGNOSIS — I10: ICD-10-CM

## 2021-10-20 DIAGNOSIS — Z88.5: ICD-10-CM

## 2021-10-20 DIAGNOSIS — Z79.01: ICD-10-CM

## 2021-10-20 LAB
ALBUMIN SERPL BCP-MCNC: 3.5 G/DL (ref 3.4–5)
ALBUMIN/GLOB SERPL: 1.1 {RATIO} (ref 0.8–1.8)
ALT SERPL W P-5'-P-CCNC: 18 U/L (ref 12–78)
ANION GAP SERPL CALCULATED.4IONS-SCNC: 5 MMOL/L (ref 6–16)
AST SERPL W P-5'-P-CCNC: 19 U/L (ref 12–37)
BASOPHILS # BLD AUTO: 0.02 K/MM3 (ref 0–0.23)
BASOPHILS NFR BLD AUTO: 0 % (ref 0–2)
BILIRUB SERPL-MCNC: 0.9 MG/DL (ref 0.1–1)
BUN SERPL-MCNC: 18 MG/DL (ref 8–24)
CALCIUM SERPL-MCNC: 8.2 MG/DL (ref 8.5–10.1)
CHLORIDE SERPL-SCNC: 103 MMOL/L (ref 98–108)
CO2 SERPL-SCNC: 26 MMOL/L (ref 21–32)
CREAT SERPL-MCNC: 1.13 MG/DL (ref 0.6–1.2)
DEPRECATED RDW RBC AUTO: 43.9 FL (ref 35.1–46.3)
EOSINOPHIL # BLD AUTO: 0.01 K/MM3 (ref 0–0.68)
EOSINOPHIL NFR BLD AUTO: 0 % (ref 0–6)
ERYTHROCYTE [DISTWIDTH] IN BLOOD BY AUTOMATED COUNT: 13.1 % (ref 11.7–14.2)
GLOBULIN SER CALC-MCNC: 3.2 G/DL (ref 2.2–4)
GLUCOSE SERPL-MCNC: 88 MG/DL (ref 70–99)
HCT VFR BLD AUTO: 39.7 % (ref 37–53)
HGB BLD-MCNC: 13.9 G/DL (ref 13.5–17.5)
IMM GRANULOCYTES # BLD AUTO: 0.01 K/MM3 (ref 0–0.1)
IMM GRANULOCYTES NFR BLD AUTO: 0 % (ref 0–1)
LYMPHOCYTES # BLD AUTO: 0.33 K/MM3 (ref 0.84–5.2)
LYMPHOCYTES NFR BLD AUTO: 6 % (ref 21–46)
MAGNESIUM SERPL-MCNC: 2.1 MG/DL (ref 1.6–2.4)
MCHC RBC AUTO-ENTMCNC: 35 G/DL (ref 31.5–36.5)
MCV RBC AUTO: 91 FL (ref 80–100)
MONOCYTES # BLD AUTO: 1.14 K/MM3 (ref 0.16–1.47)
MONOCYTES NFR BLD AUTO: 20 % (ref 4–13)
NEUTROPHILS # BLD AUTO: 4.11 K/MM3 (ref 1.96–9.15)
NEUTROPHILS NFR BLD AUTO: 73 % (ref 41–73)
NRBC # BLD AUTO: 0 K/MM3 (ref 0–0.02)
NRBC BLD AUTO-RTO: 0 /100 WBC (ref 0–0.2)
PLATELET # BLD AUTO: 124 K/MM3 (ref 150–400)
POTASSIUM SERPL-SCNC: 4.1 MMOL/L (ref 3.5–5.5)
PROT SERPL-MCNC: 6.7 G/DL (ref 6.4–8.2)
PROTHROMBIN TIME: 12.4 SEC (ref 9.7–11.5)
SODIUM SERPL-SCNC: 134 MMOL/L (ref 136–145)
TROPONIN I SERPL-MCNC: <0.015 NG/ML (ref 0–0.04)

## 2021-10-20 PROCEDURE — A9270 NON-COVERED ITEM OR SERVICE: HCPCS

## 2021-10-30 ENCOUNTER — HOSPITAL ENCOUNTER (INPATIENT)
Dept: HOSPITAL 95 - ER | Age: 76
LOS: 3 days | Discharge: HOME | DRG: 871 | End: 2021-11-02
Attending: HOSPITALIST | Admitting: HOSPITALIST
Payer: MEDICARE

## 2021-10-30 VITALS — WEIGHT: 171.74 LBS | BODY MASS INDEX: 24.04 KG/M2 | HEIGHT: 71 IN

## 2021-10-30 DIAGNOSIS — Z90.49: ICD-10-CM

## 2021-10-30 DIAGNOSIS — J15.9: ICD-10-CM

## 2021-10-30 DIAGNOSIS — Z88.5: ICD-10-CM

## 2021-10-30 DIAGNOSIS — I73.9: ICD-10-CM

## 2021-10-30 DIAGNOSIS — Z23: ICD-10-CM

## 2021-10-30 DIAGNOSIS — N17.9: ICD-10-CM

## 2021-10-30 DIAGNOSIS — C83.30: ICD-10-CM

## 2021-10-30 DIAGNOSIS — J12.82: ICD-10-CM

## 2021-10-30 DIAGNOSIS — Z96.642: ICD-10-CM

## 2021-10-30 DIAGNOSIS — E78.5: ICD-10-CM

## 2021-10-30 DIAGNOSIS — Z87.891: ICD-10-CM

## 2021-10-30 DIAGNOSIS — Z98.890: ICD-10-CM

## 2021-10-30 DIAGNOSIS — A41.89: Primary | ICD-10-CM

## 2021-10-30 DIAGNOSIS — I10: ICD-10-CM

## 2021-10-30 DIAGNOSIS — Z79.01: ICD-10-CM

## 2021-10-30 DIAGNOSIS — Z79.899: ICD-10-CM

## 2021-10-30 DIAGNOSIS — J96.01: ICD-10-CM

## 2021-10-30 DIAGNOSIS — Z86.718: ICD-10-CM

## 2021-10-30 DIAGNOSIS — Z86.73: ICD-10-CM

## 2021-10-30 DIAGNOSIS — R65.21: ICD-10-CM

## 2021-10-30 DIAGNOSIS — U07.1: ICD-10-CM

## 2021-10-30 LAB
ALBUMIN SERPL BCP-MCNC: 3.2 G/DL (ref 3.4–5)
ALBUMIN/GLOB SERPL: 0.8 {RATIO} (ref 0.8–1.8)
ALT SERPL W P-5'-P-CCNC: 17 U/L (ref 12–78)
ANION GAP SERPL CALCULATED.4IONS-SCNC: 6 MMOL/L (ref 6–16)
AST SERPL W P-5'-P-CCNC: 27 U/L (ref 12–37)
BASOPHILS # BLD AUTO: 0.01 K/MM3 (ref 0–0.23)
BASOPHILS NFR BLD AUTO: 0 % (ref 0–2)
BILIRUB SERPL-MCNC: 0.7 MG/DL (ref 0.1–1)
BUN SERPL-MCNC: 31 MG/DL (ref 8–24)
CALCIUM SERPL-MCNC: 8.4 MG/DL (ref 8.5–10.1)
CHLORIDE SERPL-SCNC: 106 MMOL/L (ref 98–108)
CO2 SERPL-SCNC: 25 MMOL/L (ref 21–32)
CREAT SERPL-MCNC: 1.74 MG/DL (ref 0.6–1.2)
DEPRECATED RDW RBC AUTO: 44.4 FL (ref 35.1–46.3)
EOSINOPHIL # BLD AUTO: 0 K/MM3 (ref 0–0.68)
EOSINOPHIL NFR BLD AUTO: 0 % (ref 0–6)
ERYTHROCYTE [DISTWIDTH] IN BLOOD BY AUTOMATED COUNT: 13.2 % (ref 11.7–14.2)
GLOBULIN SER CALC-MCNC: 3.9 G/DL (ref 2.2–4)
GLUCOSE SERPL-MCNC: 108 MG/DL (ref 70–99)
HCT VFR BLD AUTO: 41.1 % (ref 37–53)
HGB BLD-MCNC: 14.3 G/DL (ref 13.5–17.5)
IMM GRANULOCYTES # BLD AUTO: 0.04 K/MM3 (ref 0–0.1)
IMM GRANULOCYTES NFR BLD AUTO: 0 % (ref 0–1)
LYMPHOCYTES # BLD AUTO: 0.24 K/MM3 (ref 0.84–5.2)
LYMPHOCYTES NFR BLD AUTO: 2 % (ref 21–46)
MCHC RBC AUTO-ENTMCNC: 34.8 G/DL (ref 31.5–36.5)
MCV RBC AUTO: 92 FL (ref 80–100)
MONOCYTES # BLD AUTO: 0.91 K/MM3 (ref 0.16–1.47)
MONOCYTES NFR BLD AUTO: 9 % (ref 4–13)
NEUTROPHILS # BLD AUTO: 8.73 K/MM3 (ref 1.96–9.15)
NEUTROPHILS NFR BLD AUTO: 88 % (ref 41–73)
NRBC # BLD AUTO: 0 K/MM3 (ref 0–0.02)
NRBC BLD AUTO-RTO: 0 /100 WBC (ref 0–0.2)
PLATELET # BLD AUTO: 213 K/MM3 (ref 150–400)
POTASSIUM SERPL-SCNC: 4.3 MMOL/L (ref 3.5–5.5)
PROT SERPL-MCNC: 7.1 G/DL (ref 6.4–8.2)
SARS-COV-2 RNA RESP QL NAA+PROBE: POSITIVE
SODIUM SERPL-SCNC: 137 MMOL/L (ref 136–145)
TROPONIN I SERPL-MCNC: <0.015 NG/ML (ref 0–0.04)

## 2021-10-30 PROCEDURE — 8E0ZXY6 ISOLATION: ICD-10-PCS | Performed by: HOSPITALIST

## 2021-10-30 PROCEDURE — U0004 COV-19 TEST NON-CDC HGH THRU: HCPCS

## 2021-10-30 PROCEDURE — 3E033XZ INTRODUCTION OF VASOPRESSOR INTO PERIPHERAL VEIN, PERCUTANEOUS APPROACH: ICD-10-PCS | Performed by: HOSPITALIST

## 2021-10-30 PROCEDURE — 3E0333Z INTRODUCTION OF ANTI-INFLAMMATORY INTO PERIPHERAL VEIN, PERCUTANEOUS APPROACH: ICD-10-PCS | Performed by: HOSPITALIST

## 2021-10-30 PROCEDURE — 3E02340 INTRODUCTION OF INFLUENZA VACCINE INTO MUSCLE, PERCUTANEOUS APPROACH: ICD-10-PCS | Performed by: HOSPITALIST

## 2021-10-30 PROCEDURE — A9270 NON-COVERED ITEM OR SERVICE: HCPCS

## 2021-10-30 NOTE — NUR
ASSUMED CARE
 
RECEIVED REPORT FROM [facility] AT 2220. PT ARRIVED FROM ED AT 2230. HE IS
A&OX4, PLEASANT AND COOPERATIVE. HE IS ABLE TO AMBULATE TO BEDSIDE
COMMODE WITH MINIMAL ASSISTANCE. GENERALIZED WEAKNESS R/T CONDITION. CURRENTLY
FINISHING FIRST 1000ML BOLUS FROM THE ED. HE IS AFEBRILE AT 98.3. HE IS ON 5L
VIA NC, SPO2 96-97%, HE DENIES SOB/DYSPNEA. LUNGS ARE DIMINISHED T/O. HR IS SR
IN 60-70'S. SBP CURRENTLY 'S, MAP >65. LEVOPHED GTT IS AVAILABLE, BUT
NOT STARTED.  ABDOMEN IS SOFT, BOWEL TONES X4. USING URINAL AT BEDSIDE. SKIN
IS OVERALL C/D/I, BLE ARE DISCOLORED WITH SCALY, DRY SKIN/PATCHES. PT HAS
MEDIPORT IN RIGHT UPPER CHEST, FLUIDS INFUSING. ORDERS REVIEWED AND WILL TREAT
AS PRESCRIBED.

## 2021-10-31 LAB
ANION GAP SERPL CALCULATED.4IONS-SCNC: 6 MMOL/L (ref 6–16)
BASOPHILS # BLD AUTO: 0 K/MM3 (ref 0–0.23)
BASOPHILS NFR BLD AUTO: 0 % (ref 0–2)
BUN SERPL-MCNC: 33 MG/DL (ref 8–24)
CALCIUM SERPL-MCNC: 7.4 MG/DL (ref 8.5–10.1)
CHLORIDE SERPL-SCNC: 113 MMOL/L (ref 98–108)
CO2 SERPL-SCNC: 22 MMOL/L (ref 21–32)
CREAT SERPL-MCNC: 1.36 MG/DL (ref 0.6–1.2)
DEPRECATED RDW RBC AUTO: 45.3 FL (ref 35.1–46.3)
EOSINOPHIL # BLD AUTO: 0 K/MM3 (ref 0–0.68)
EOSINOPHIL NFR BLD AUTO: 0 % (ref 0–6)
ERYTHROCYTE [DISTWIDTH] IN BLOOD BY AUTOMATED COUNT: 13.2 % (ref 11.7–14.2)
GLUCOSE SERPL-MCNC: 159 MG/DL (ref 70–99)
HCT VFR BLD AUTO: 37.2 % (ref 37–53)
HGB BLD-MCNC: 12.6 G/DL (ref 13.5–17.5)
IMM GRANULOCYTES # BLD AUTO: 0.02 K/MM3 (ref 0–0.1)
IMM GRANULOCYTES NFR BLD AUTO: 1 % (ref 0–1)
LYMPHOCYTES # BLD AUTO: 0.14 K/MM3 (ref 0.84–5.2)
LYMPHOCYTES NFR BLD AUTO: 4 % (ref 21–46)
MCHC RBC AUTO-ENTMCNC: 33.9 G/DL (ref 31.5–36.5)
MCV RBC AUTO: 93 FL (ref 80–100)
MONOCYTES # BLD AUTO: 0.1 K/MM3 (ref 0.16–1.47)
MONOCYTES NFR BLD AUTO: 3 % (ref 4–13)
NEUTROPHILS # BLD AUTO: 3.52 K/MM3 (ref 1.96–9.15)
NEUTROPHILS NFR BLD AUTO: 93 % (ref 41–73)
NRBC # BLD AUTO: 0 K/MM3 (ref 0–0.02)
NRBC BLD AUTO-RTO: 0 /100 WBC (ref 0–0.2)
PLATELET # BLD AUTO: 149 K/MM3 (ref 150–400)
POTASSIUM SERPL-SCNC: 4.6 MMOL/L (ref 3.5–5.5)
PROTHROMBIN TIME: 35 SEC (ref 9.7–11.5)
SODIUM SERPL-SCNC: 141 MMOL/L (ref 136–145)

## 2021-10-31 PROCEDURE — XW033E5 INTRODUCTION OF REMDESIVIR ANTI-INFECTIVE INTO PERIPHERAL VEIN, PERCUTANEOUS APPROACH, NEW TECHNOLOGY GROUP 5: ICD-10-PCS | Performed by: HOSPITALIST

## 2021-10-31 NOTE — NUR
REPORT GIVEN
REPORT GIVEN TO YARED SANTACRUZ AT 1950. TWO PCU PCT'S TO TRANSFER PT UP TO ROOM 357
VIA ICU BED.

## 2021-10-31 NOTE — NUR
AOx4, complains of 3/10 chronic L hip pain, nonverbal indicators of pain not
present, refuses any intervention for pain, NSR 80s, no ectopy noted, +2/+1
radial/dorsal pulses respectively, lungs coarse with exp wheezes, Pt at times
accessory muscle use and complains SOB despite O2 WNL, IS initiated with good
tolerance and Duneb order with good results absence of wheezes and overall
improvement, voids urinal chel output, audible bowels non-tender, up to chair
half of shift 1 person assist.

## 2021-10-31 NOTE — NUR
PT COMPLAINING OF SOB AND DYSPNEA, AND INCREASED COUGHING. REQUESTING
TO SIT ON SIDE OF BED AND TRIPODING. SPO2 CONTINUES AT 96% ON 4L VIA NC.
EXPIRATORY WHEEZE NOTED T/O.  CALL PLACED TO DR. SERVIN. ORDERS GIVEN FOR
BRONCHODILATOR PROTOCOL, INCREASE DEXAMETHASONE TO 60MG BID IV, AND BNP DRAWN
THIS AM. RT CALLED AND TREATMENT NOT ADVISED. O2 CONTINUES AT 4L VIA NC AND PT
SEEMS TO HAVE IMPROVED FROM SITTING ON BEDSIDE. SPO2 97%, ABLE TO SPEAK IN
FULL SENTENCES AND COUGHING IMPROVED.

## 2021-10-31 NOTE — NUR
PT CURRENTLY SLEEPING. O2 DOWN TO 2L VIA NC, SPO2 93-96%. NO LONGER REPORTING
SOB/DYSPNEA, ABLE TO SPEAK IN FULL SENTENCES. HR REMAINED SR IN 60-70'S. BP
REMAINED STABLE WITH MAP >65, LEVOPHED NOT STARTED. PT HAD 2 LOOSE/LIQUID
BOWEL MOVEMENTS. ABLE TO STAND AND USE BEDSIDE COMMODE WITH MINIMAL
ASSISTANCE. CONTINUES TO BE A&O X4. REMAINED AFEBRILE T/O SHIFT, HOWEVER DID
HAVE FREQUENT EPISODES OF CHILLS. MEDIPORT REMAINS ACCESSED, INFUSING NS AT
150ML/HR. WILL REPORT TO ONCOMING SHIFT

## 2021-11-01 LAB
ANION GAP SERPL CALCULATED.4IONS-SCNC: 7 MMOL/L (ref 6–16)
BASOPHILS # BLD AUTO: 0 K/MM3 (ref 0–0.23)
BASOPHILS NFR BLD AUTO: 0 % (ref 0–2)
BUN SERPL-MCNC: 37 MG/DL (ref 8–24)
CALCIUM SERPL-MCNC: 7.9 MG/DL (ref 8.5–10.1)
CHLORIDE SERPL-SCNC: 114 MMOL/L (ref 98–108)
CO2 SERPL-SCNC: 21 MMOL/L (ref 21–32)
CREAT SERPL-MCNC: 1.02 MG/DL (ref 0.6–1.2)
DEPRECATED RDW RBC AUTO: 44.4 FL (ref 35.1–46.3)
EOSINOPHIL # BLD AUTO: 0 K/MM3 (ref 0–0.68)
EOSINOPHIL NFR BLD AUTO: 0 % (ref 0–6)
ERYTHROCYTE [DISTWIDTH] IN BLOOD BY AUTOMATED COUNT: 13.2 % (ref 11.7–14.2)
GLUCOSE SERPL-MCNC: 157 MG/DL (ref 70–99)
HCT VFR BLD AUTO: 37.5 % (ref 37–53)
HGB BLD-MCNC: 12.9 G/DL (ref 13.5–17.5)
IMM GRANULOCYTES # BLD AUTO: 0.03 K/MM3 (ref 0–0.1)
IMM GRANULOCYTES NFR BLD AUTO: 0 % (ref 0–1)
LYMPHOCYTES # BLD AUTO: 0.16 K/MM3 (ref 0.84–5.2)
LYMPHOCYTES NFR BLD AUTO: 2 % (ref 21–46)
MCHC RBC AUTO-ENTMCNC: 34.4 G/DL (ref 31.5–36.5)
MCV RBC AUTO: 91 FL (ref 80–100)
MONOCYTES # BLD AUTO: 0.17 K/MM3 (ref 0.16–1.47)
MONOCYTES NFR BLD AUTO: 2 % (ref 4–13)
NEUTROPHILS # BLD AUTO: 6.66 K/MM3 (ref 1.96–9.15)
NEUTROPHILS NFR BLD AUTO: 95 % (ref 41–73)
NRBC # BLD AUTO: 0 K/MM3 (ref 0–0.02)
NRBC BLD AUTO-RTO: 0 /100 WBC (ref 0–0.2)
PLATELET # BLD AUTO: 192 K/MM3 (ref 150–400)
POTASSIUM SERPL-SCNC: 4.3 MMOL/L (ref 3.5–5.5)
PROTHROMBIN TIME: 32.5 SEC (ref 9.7–11.5)
SODIUM SERPL-SCNC: 142 MMOL/L (ref 136–145)

## 2021-11-01 NOTE — NUR
PT AMBULATE WITH WALKER TO BATHROOM,PT SATED HE'S BEEN COUGHING,SAMPLE
COLLECTED SENT TO LAB,PT BLE COARSE T/O LUNGS FIELD,PT IS COVID POSITIVE,PT
SATURATION DROPS TO HIGH 80S WITH EXCERTION,PT ON 2L NC WHEN MOVING AROUND.PT
ON ROOM AIR SATS IN HIGH 90S WHEN SITTING,PT HAVE PSORIASIS,PATCH ON
BUTTOCKS,PT HAD A BM THIS AM,PT ALERT ORIENTED,PT C/O OF SWALLOW,PT DIET
CHANGE TO MECHANICAL SOFT,PT DENIES PAIN,N/V,SOB.PT IN BED,BED IN LOW
POSITION,CALL LIGHT IN [facility] WILL CONTINUE TO MONITOR.

## 2021-11-01 NOTE — NUR
NIGHT SHIFT SUMMARY
 PT ARRIVED TO UNIT AT 2034, TRANSFERRED FROM ICU. PT IS A/O X4, SLEPT WELL
TONIGHT. AMBULATES WITH 1 ASSIST AND FWW TO THE BATHROOM. CONTINUES TO BE ON
2L O2 VIA NC SATTING IN THE HIGH 90'S. FINE COARSE LUNG SOUNDS THROUGHOUT LUNG
FIELDS. VSS. CALL LIGHT WITHIN REACH, WILL CONTINUE TO MONITOR.

## 2021-11-02 LAB — PROTHROMBIN TIME: 23.6 SEC (ref 9.7–11.5)

## 2021-11-02 NOTE — NUR
PT ALERT ORIENTED X 4,PT REMAINS ON 2L NC WITH EXERTION,AND ROOM AIR SATTING
97 WITHOUT EXERTION,PT DENIES PAIN,N/V AND SHORTNESS OF BREATH.PT IS DISCHARGE
HOME WITH HOME HEALTH AND HOME O2,O2 JUST GOT DELIVER AND PT IS DRESS ALL
BELONGINGS PACK,AND WAITING FOR WIFE TO COME PICK HIM UP.

## 2021-11-02 NOTE — NUR
NIGHT SHIFT SUMMARY
 
AWAKE AT INTERVALS. WAS SITTING IN CHAIR AT BEDSIDE MOST OF PM AND WHEN ASKED
IF HE NEEDED ASSIST TO BED, HE DECLINED AND SAID HE COULD DO IT. O2 PER NC.
LUNG SOUNDS DIMINISHED WITH WET COUGH WHEN ASSESSED. ANTIBIOTICS ADMINISTERED
AS ORDERED - SEE MAR FOR DETAILS. CALL LIGHT IN REACH. ISOLATION PRECAUTIONS
MAINTAINED. SPUTUM CX SENT, AWAITING RESULTS FROM LAB

## 2021-11-11 ENCOUNTER — HOSPITAL ENCOUNTER (INPATIENT)
Dept: HOSPITAL 95 - ER | Age: 76
LOS: 4 days | Discharge: HOME HEALTH | DRG: 871 | End: 2021-11-15
Attending: HOSPITALIST | Admitting: HOSPITALIST
Payer: MEDICARE

## 2021-11-11 VITALS — HEIGHT: 71 IN | BODY MASS INDEX: 23.55 KG/M2 | WEIGHT: 168.21 LBS

## 2021-11-11 DIAGNOSIS — U07.1: ICD-10-CM

## 2021-11-11 DIAGNOSIS — E83.39: ICD-10-CM

## 2021-11-11 DIAGNOSIS — Z86.73: ICD-10-CM

## 2021-11-11 DIAGNOSIS — A41.9: Primary | ICD-10-CM

## 2021-11-11 DIAGNOSIS — L40.9: ICD-10-CM

## 2021-11-11 DIAGNOSIS — Z90.49: ICD-10-CM

## 2021-11-11 DIAGNOSIS — J44.1: ICD-10-CM

## 2021-11-11 DIAGNOSIS — Z98.890: ICD-10-CM

## 2021-11-11 DIAGNOSIS — Z79.01: ICD-10-CM

## 2021-11-11 DIAGNOSIS — J44.0: ICD-10-CM

## 2021-11-11 DIAGNOSIS — J69.0: ICD-10-CM

## 2021-11-11 DIAGNOSIS — R13.12: ICD-10-CM

## 2021-11-11 DIAGNOSIS — J96.21: ICD-10-CM

## 2021-11-11 DIAGNOSIS — Z79.899: ICD-10-CM

## 2021-11-11 DIAGNOSIS — Z86.718: ICD-10-CM

## 2021-11-11 DIAGNOSIS — Z88.5: ICD-10-CM

## 2021-11-11 DIAGNOSIS — J18.9: ICD-10-CM

## 2021-11-11 DIAGNOSIS — Z87.891: ICD-10-CM

## 2021-11-11 DIAGNOSIS — I73.9: ICD-10-CM

## 2021-11-11 DIAGNOSIS — C83.30: ICD-10-CM

## 2021-11-11 DIAGNOSIS — I10: ICD-10-CM

## 2021-11-11 DIAGNOSIS — E78.5: ICD-10-CM

## 2021-11-11 LAB
ALBUMIN SERPL BCP-MCNC: 2.8 G/DL (ref 3.4–5)
ALBUMIN/GLOB SERPL: 0.8 {RATIO} (ref 0.8–1.8)
ALT SERPL W P-5'-P-CCNC: 27 U/L (ref 12–78)
ANION GAP SERPL CALCULATED.4IONS-SCNC: 8 MMOL/L (ref 6–16)
AST SERPL W P-5'-P-CCNC: 17 U/L (ref 12–37)
BASOPHILS # BLD AUTO: 0.03 K/MM3 (ref 0–0.23)
BASOPHILS NFR BLD AUTO: 0 % (ref 0–2)
BILIRUB SERPL-MCNC: 1.4 MG/DL (ref 0.1–1)
BUN SERPL-MCNC: 30 MG/DL (ref 8–24)
CALCIUM SERPL-MCNC: 8 MG/DL (ref 8.5–10.1)
CHLORIDE SERPL-SCNC: 109 MMOL/L (ref 98–108)
CO2 SERPL-SCNC: 26 MMOL/L (ref 21–32)
CREAT SERPL-MCNC: 1 MG/DL (ref 0.6–1.2)
DEPRECATED RDW RBC AUTO: 45.7 FL (ref 35.1–46.3)
EOSINOPHIL # BLD AUTO: 0 K/MM3 (ref 0–0.68)
EOSINOPHIL NFR BLD AUTO: 0 % (ref 0–6)
ERYTHROCYTE [DISTWIDTH] IN BLOOD BY AUTOMATED COUNT: 14 % (ref 11.7–14.2)
GLOBULIN SER CALC-MCNC: 3.4 G/DL (ref 2.2–4)
GLUCOSE SERPL-MCNC: 102 MG/DL (ref 70–99)
HCT VFR BLD AUTO: 45.5 % (ref 37–53)
HGB BLD-MCNC: 15.5 G/DL (ref 13.5–17.5)
IMM GRANULOCYTES # BLD AUTO: 0.15 K/MM3 (ref 0–0.1)
IMM GRANULOCYTES NFR BLD AUTO: 1 % (ref 0–1)
LYMPHOCYTES # BLD AUTO: 0.14 K/MM3 (ref 0.84–5.2)
LYMPHOCYTES NFR BLD AUTO: 1 % (ref 21–46)
MCHC RBC AUTO-ENTMCNC: 34.1 G/DL (ref 31.5–36.5)
MCV RBC AUTO: 93 FL (ref 80–100)
MONOCYTES # BLD AUTO: 1.83 K/MM3 (ref 0.16–1.47)
MONOCYTES NFR BLD AUTO: 8 % (ref 4–13)
NEUTROPHILS # BLD AUTO: 20.62 K/MM3 (ref 1.96–9.15)
NEUTROPHILS NFR BLD AUTO: 91 % (ref 41–73)
NRBC # BLD AUTO: 0 K/MM3 (ref 0–0.02)
NRBC BLD AUTO-RTO: 0 /100 WBC (ref 0–0.2)
PLATELET # BLD AUTO: 183 K/MM3 (ref 150–400)
POTASSIUM SERPL-SCNC: 3.9 MMOL/L (ref 3.5–5.5)
PROT SERPL-MCNC: 6.2 G/DL (ref 6.4–8.2)
PROTHROMBIN TIME: 72.1 SEC (ref 9.7–11.5)
SODIUM SERPL-SCNC: 143 MMOL/L (ref 136–145)
TROPONIN I SERPL-MCNC: <0.015 NG/ML (ref 0–0.04)

## 2021-11-11 PROCEDURE — A9270 NON-COVERED ITEM OR SERVICE: HCPCS

## 2021-11-11 PROCEDURE — 8E0ZXY6 ISOLATION: ICD-10-PCS | Performed by: HOSPITALIST

## 2021-11-11 NOTE — NUR
SHIFT SUMMARY
 
PATIENT IS ALERT AND ORIENTATED X4. PATIENT WAS ADMITTED FROM THE ED AT 1630.
ADMISSION IS COMPLETE. PATIENT IS ON 6 LITERS OXIMIZER SATTING 95-98 PERCENT.
PATIENT HAD A TEMP IN THE ED AND PATIENT STATES THAT HE DOES NOT FEEL A FEVER.
MEDICATED PER EMAR WITH TORADOL AND TYLENOL. VITAL SIGNS REVIEWED. BED IN
LOWEST POSITION AND CALL LIGHT IN REACH. WILL MONITOR UNTIL SHIFT CHANGE.

## 2021-11-12 LAB
ALBUMIN SERPL BCP-MCNC: 2 G/DL (ref 3.4–5)
ALBUMIN/GLOB SERPL: 0.7 {RATIO} (ref 0.8–1.8)
ALT SERPL W P-5'-P-CCNC: 18 U/L (ref 12–78)
ANION GAP SERPL CALCULATED.4IONS-SCNC: 3 MMOL/L (ref 6–16)
AST SERPL W P-5'-P-CCNC: 16 U/L (ref 12–37)
BASOPHILS # BLD AUTO: 0.01 K/MM3 (ref 0–0.23)
BASOPHILS NFR BLD AUTO: 0 % (ref 0–2)
BILIRUB SERPL-MCNC: 1.3 MG/DL (ref 0.1–1)
BUN SERPL-MCNC: 27 MG/DL (ref 8–24)
CALCIUM SERPL-MCNC: 7.3 MG/DL (ref 8.5–10.1)
CHLORIDE SERPL-SCNC: 112 MMOL/L (ref 98–108)
CO2 SERPL-SCNC: 28 MMOL/L (ref 21–32)
CREAT SERPL-MCNC: 0.98 MG/DL (ref 0.6–1.2)
DEPRECATED RDW RBC AUTO: 46.5 FL (ref 35.1–46.3)
EOSINOPHIL # BLD AUTO: 0.04 K/MM3 (ref 0–0.68)
EOSINOPHIL NFR BLD AUTO: 1 % (ref 0–6)
ERYTHROCYTE [DISTWIDTH] IN BLOOD BY AUTOMATED COUNT: 14.2 % (ref 11.7–14.2)
GLOBULIN SER CALC-MCNC: 3 G/DL (ref 2.2–4)
GLUCOSE SERPL-MCNC: 89 MG/DL (ref 70–99)
HCT VFR BLD AUTO: 37 % (ref 37–53)
HGB BLD-MCNC: 12.4 G/DL (ref 13.5–17.5)
IMM GRANULOCYTES # BLD AUTO: 0.04 K/MM3 (ref 0–0.1)
IMM GRANULOCYTES NFR BLD AUTO: 1 % (ref 0–1)
LYMPHOCYTES # BLD AUTO: 0.25 K/MM3 (ref 0.84–5.2)
LYMPHOCYTES NFR BLD AUTO: 3 % (ref 21–46)
MAGNESIUM SERPL-MCNC: 1.9 MG/DL (ref 1.6–2.4)
MCHC RBC AUTO-ENTMCNC: 33.5 G/DL (ref 31.5–36.5)
MCV RBC AUTO: 95 FL (ref 80–100)
MONOCYTES # BLD AUTO: 0.8 K/MM3 (ref 0.16–1.47)
MONOCYTES NFR BLD AUTO: 11 % (ref 4–13)
NEUTROPHILS # BLD AUTO: 6.21 K/MM3 (ref 1.96–9.15)
NEUTROPHILS NFR BLD AUTO: 85 % (ref 41–73)
NRBC # BLD AUTO: 0 K/MM3 (ref 0–0.02)
NRBC BLD AUTO-RTO: 0 /100 WBC (ref 0–0.2)
PLATELET # BLD AUTO: 128 K/MM3 (ref 150–400)
POTASSIUM SERPL-SCNC: 4.5 MMOL/L (ref 3.5–5.5)
PROT SERPL-MCNC: 5 G/DL (ref 6.4–8.2)
PROTHROMBIN TIME: 70.6 SEC (ref 9.7–11.5)
SODIUM SERPL-SCNC: 143 MMOL/L (ref 136–145)

## 2021-11-12 NOTE — NUR
SHIFT SUMMARY
PT IS AA&OX3, ABLE TO MAKE NEEDS KNOWN. PT IS COOPERATIVE WITH CARE. PT HAS
DIFFICULTY SWALLOWING BIG PILLS DUE TO ASPIRATION RISK. MEDS ADMINISTERED IN
APPLE SAUCE PER EMAR. PT HAD A CT SCAN DURING THIS SHIFT, RESULTS STILL
PENDING. DENIES PAIN OR DISCOMFORT. ADLS PROVIDED,SAFETY MEASURES IN PLACE.
WILL CONTINUE TO MONITOR. none

## 2021-11-13 LAB
ALBUMIN SERPL BCP-MCNC: 1.9 G/DL (ref 3.4–5)
ANION GAP SERPL CALCULATED.4IONS-SCNC: 1 MMOL/L (ref 6–16)
BUN SERPL-MCNC: 18 MG/DL (ref 8–24)
CALCIUM SERPL-MCNC: 7.1 MG/DL (ref 8.5–10.1)
CHLORIDE SERPL-SCNC: 110 MMOL/L (ref 98–108)
CO2 SERPL-SCNC: 31 MMOL/L (ref 21–32)
CREAT SERPL-MCNC: 1.02 MG/DL (ref 0.6–1.2)
DEPRECATED RDW RBC AUTO: 46 FL (ref 35.1–46.3)
ERYTHROCYTE [DISTWIDTH] IN BLOOD BY AUTOMATED COUNT: 13.9 % (ref 11.7–14.2)
GLUCOSE SERPL-MCNC: 87 MG/DL (ref 70–99)
HCT VFR BLD AUTO: 34 % (ref 37–53)
HGB BLD-MCNC: 11.3 G/DL (ref 13.5–17.5)
MCHC RBC AUTO-ENTMCNC: 33.2 G/DL (ref 31.5–36.5)
MCV RBC AUTO: 95 FL (ref 80–100)
NRBC # BLD AUTO: 0 K/MM3 (ref 0–0.02)
NRBC BLD AUTO-RTO: 0 /100 WBC (ref 0–0.2)
PHOSPHATE SERPL-MCNC: 2.4 MG/DL (ref 2.5–4.9)
PLATELET # BLD AUTO: 119 K/MM3 (ref 150–400)
POTASSIUM SERPL-SCNC: 4.2 MMOL/L (ref 3.5–5.5)
PROTHROMBIN TIME: 37.1 SEC (ref 9.7–11.5)
SODIUM SERPL-SCNC: 142 MMOL/L (ref 136–145)
SP GR SPEC: 1.01 (ref 1–1.02)
UROBILINOGEN UR STRIP-MCNC: (no result) MG/DL
VANCOMYCIN TROUGH SERPL-MCNC: 18.9 UG/ML (ref 5–10)

## 2021-11-13 NOTE — NUR
NO CHANGES TO REPORT THROUGH NIGHT. PT REMAINS ORIENTED X4, IND IN ROOM AND
MAKES NO COMPLAINTS OF SOB. ABT RUNNING AND IV WNL. PT STATES HE FEELS WORN
DOWN FROM BEING SICK. STAFF WILL CONT TO  MONITOR.

## 2021-11-13 NOTE — NUR
SHIFT SUMMARY
 
PATIENT IS ALERT AND ORIENTED X4. PATIENT IS INDENDENT IN THE ROOM TO BEDSIDE
COMMODE AND URINAL. PATIENT WAS ON 6 LITERS OXIMIZER BUT IS ON ROOM AIR NOW
SATTING IN THE MID 90S. NO ACUTE CHANGES THIS SHIFT. VITAL SIGNS REVIEWED.
WILL CONTINUE TO MONITOR UNTIL SHIFT CHANGE.

## 2021-11-14 LAB
ALBUMIN SERPL BCP-MCNC: 2 G/DL (ref 3.4–5)
ANION GAP SERPL CALCULATED.4IONS-SCNC: 2 MMOL/L (ref 6–16)
BUN SERPL-MCNC: 11 MG/DL (ref 8–24)
CALCIUM SERPL-MCNC: 7.1 MG/DL (ref 8.5–10.1)
CHLORIDE SERPL-SCNC: 111 MMOL/L (ref 98–108)
CO2 SERPL-SCNC: 28 MMOL/L (ref 21–32)
CREAT SERPL-MCNC: 0.92 MG/DL (ref 0.6–1.2)
DEPRECATED RDW RBC AUTO: 45 FL (ref 35.1–46.3)
ERYTHROCYTE [DISTWIDTH] IN BLOOD BY AUTOMATED COUNT: 13.6 % (ref 11.7–14.2)
GLUCOSE SERPL-MCNC: 87 MG/DL (ref 70–99)
HCT VFR BLD AUTO: 34.9 % (ref 37–53)
HGB BLD-MCNC: 11.9 G/DL (ref 13.5–17.5)
MCHC RBC AUTO-ENTMCNC: 34.1 G/DL (ref 31.5–36.5)
MCV RBC AUTO: 93 FL (ref 80–100)
NRBC # BLD AUTO: 0 K/MM3 (ref 0–0.02)
NRBC BLD AUTO-RTO: 0 /100 WBC (ref 0–0.2)
PHOSPHATE SERPL-MCNC: 2.5 MG/DL (ref 2.5–4.9)
PLATELET # BLD AUTO: 121 K/MM3 (ref 150–400)
POTASSIUM SERPL-SCNC: 3.9 MMOL/L (ref 3.5–5.5)
PROTHROMBIN TIME: 21.3 SEC (ref 9.7–11.5)
SODIUM SERPL-SCNC: 141 MMOL/L (ref 136–145)
VANCOMYCIN TROUGH SERPL-MCNC: 22 UG/ML (ref 5–10)

## 2021-11-14 NOTE — NUR
PT ORIENTED X4, SLEPT WELL ON ROOM AIR THROUGH NIGHT,
MAKES NO COMPLAINTS OF SOB. SATURATION GOOD. IND IN ROOM.
ABT'S RUNNIG AND IV WNL. STAFF
WILL CONT TO MONITOR.

## 2021-11-14 NOTE — NUR
SHIFT SUMMARY
 
PATIENT IS ALERT AND ORIENTED X4. PATIENT IS IND TO BEDSIDE COMMODE AND
URINAL. PATIENT NO ACUTE EVENTS THIS SHIFT. PATIENT HAS HAD CONTINUOUS IV
FLUIDS DCD. PATIENT HAS HAD NO COMPLAINTS OF SOB, PAIN, NAUSEA, VOMITTING THIS
SHIFT. VITAL SIGNS REVIEWED. WILL MONITOR UNTIL SHIFT CHANGE.

## 2021-11-15 LAB — PROTHROMBIN TIME: 15.1 SEC (ref 9.7–11.5)

## 2021-11-15 NOTE — NUR
PT HAS SLEPT FOR MOST OF THE NIGHT WITH IV ABT'S RUNNING. AT AROUND 0500, PT
COMPLAINED OF SOB, WAS SAT UP AND REQUESTED O2 VIA OXEMIZER. RT CALLED AND
BREATHING TREATMENT ADMINISTERED. PT STATES HE FEELS BETTER AND BUT WOULD
STILL LIKE TO USE O2 SET AT 3L. STAFF WILL CONTINUE TO MONITOR.

## 2021-11-15 NOTE — NUR
DISCHARGE
 
PT HAS BEEN DISCHARGED HOME WITH HOME HEALTH AT 13:25 PORT DEACCESSED AND
DISCHARGE INFORMATION GONE OVER.

## 2022-01-01 ENCOUNTER — HOSPITAL ENCOUNTER (OUTPATIENT)
Dept: HOSPITAL 95 - LAB SHORT | Age: 77
Discharge: HOME | End: 2022-01-01
Attending: NURSE PRACTITIONER
Payer: MEDICARE

## 2022-01-01 DIAGNOSIS — Z12.11: Primary | ICD-10-CM

## 2022-01-01 PROCEDURE — G0328 FECAL BLOOD SCRN IMMUNOASSAY: HCPCS

## 2022-01-16 NOTE — NUR
SHIFT SUMMARY
 
PATIENT IS ALERT AND ORIENTATED X4. PATIENT IS PLEASENT AND COOPERATIVE WITH
CARE. PATIENT HAS HAD DIFFICULTY SWALLOWING HX SINCE CANCER DX. PATIENT HAS
HAD A SWALLOW EVAL DONE AS WELL AS SWALLOW XRAY DONE. PATIENTS DIET HAS
CHANGED TO MEAL SUPERVISION AND ASPIRATION PRECAUTIONS. NO ACUTE EVENTS THIS
SHIFT. PATIENT IS ON 6L OXIMIZER AND SATTING ABOVE 90 PERCENT. CALL LIGHT IN
PLACE. WILL MONITOR UNTIL SHIFT CHANGE. No assistance needed

## 2022-04-05 ENCOUNTER — HOSPITAL ENCOUNTER (OUTPATIENT)
Dept: HOSPITAL 95 - ORSCMMR | Age: 77
Discharge: HOME | End: 2022-04-05
Attending: STUDENT IN AN ORGANIZED HEALTH CARE EDUCATION/TRAINING PROGRAM
Payer: MEDICARE

## 2022-04-05 VITALS — BODY MASS INDEX: 24.66 KG/M2 | HEIGHT: 71 IN | WEIGHT: 176.15 LBS

## 2022-04-05 DIAGNOSIS — Z12.11: Primary | ICD-10-CM

## 2022-04-05 DIAGNOSIS — Z53.9: ICD-10-CM

## 2022-04-05 NOTE — NUR
INTO SDS VIA WC.
History, Chart, Medications and Allergies reviewed before start of
procedure. Lungs clear T/O to Auscultation.
Patient confirms NPO status and agrees with scheduled surgery.
Pre-Op teaching done. Pt verbalizes understanding.
Patient States Post-Procedure ride home has been arranged.

## 2022-04-12 ENCOUNTER — HOSPITAL ENCOUNTER (OUTPATIENT)
Dept: HOSPITAL 95 - LAB SHORT | Age: 77
Discharge: HOME | End: 2022-04-12
Attending: INTERNAL MEDICINE
Payer: MEDICARE

## 2022-04-12 DIAGNOSIS — C85.10: Primary | ICD-10-CM

## 2022-04-12 LAB
ALBUMIN SERPL BCP-MCNC: 3.8 G/DL (ref 3.4–5)
ALBUMIN/GLOB SERPL: 1.6 {RATIO} (ref 0.8–1.8)
ALT SERPL W P-5'-P-CCNC: 17 U/L (ref 12–78)
ANION GAP SERPL CALCULATED.4IONS-SCNC: 5 MMOL/L (ref 6–16)
AST SERPL W P-5'-P-CCNC: 14 U/L (ref 12–37)
BILIRUB SERPL-MCNC: 0.7 MG/DL (ref 0.1–1)
BUN SERPL-MCNC: 17 MG/DL (ref 8–24)
CALCIUM SERPL-MCNC: 8.5 MG/DL (ref 8.5–10.1)
CHLORIDE SERPL-SCNC: 107 MMOL/L (ref 98–108)
CO2 SERPL-SCNC: 30 MMOL/L (ref 21–32)
CREAT SERPL-MCNC: 1.08 MG/DL (ref 0.6–1.2)
GLOBULIN SER CALC-MCNC: 2.4 G/DL (ref 2.2–4)
GLUCOSE SERPL-MCNC: 92 MG/DL (ref 70–99)
LDH SERPL-CCNC: 154 U/L (ref 100–240)
PHOSPHATE SERPL-MCNC: 2.9 MG/DL (ref 2.5–4.9)
POTASSIUM SERPL-SCNC: 4.5 MMOL/L (ref 3.5–5.5)
PROT SERPL-MCNC: 6.2 G/DL (ref 6.4–8.2)
SODIUM SERPL-SCNC: 142 MMOL/L (ref 136–145)

## 2022-05-16 ENCOUNTER — HOSPITAL ENCOUNTER (OUTPATIENT)
Dept: HOSPITAL 95 - ORSCSDS | Age: 77
Discharge: HOME | End: 2022-05-16
Attending: STUDENT IN AN ORGANIZED HEALTH CARE EDUCATION/TRAINING PROGRAM
Payer: MEDICARE

## 2022-05-16 VITALS — BODY MASS INDEX: 24.6 KG/M2 | WEIGHT: 175.71 LBS | HEIGHT: 71 IN

## 2022-05-16 DIAGNOSIS — Z79.01: ICD-10-CM

## 2022-05-16 DIAGNOSIS — E78.5: ICD-10-CM

## 2022-05-16 DIAGNOSIS — K64.4: ICD-10-CM

## 2022-05-16 DIAGNOSIS — R19.5: Primary | ICD-10-CM

## 2022-05-16 DIAGNOSIS — Z86.718: ICD-10-CM

## 2022-05-16 DIAGNOSIS — Z79.899: ICD-10-CM

## 2022-05-16 DIAGNOSIS — Z86.73: ICD-10-CM

## 2022-05-16 DIAGNOSIS — Z87.891: ICD-10-CM

## 2022-05-16 DIAGNOSIS — K55.20: ICD-10-CM

## 2022-05-16 DIAGNOSIS — C83.30: ICD-10-CM

## 2022-05-16 DIAGNOSIS — D12.2: ICD-10-CM

## 2022-05-16 DIAGNOSIS — I73.9: ICD-10-CM

## 2022-05-16 DIAGNOSIS — I10: ICD-10-CM

## 2022-05-16 PROCEDURE — 0DBK8ZX EXCISION OF ASCENDING COLON, VIA NATURAL OR ARTIFICIAL OPENING ENDOSCOPIC, DIAGNOSTIC: ICD-10-PCS | Performed by: STUDENT IN AN ORGANIZED HEALTH CARE EDUCATION/TRAINING PROGRAM

## 2022-05-16 PROCEDURE — 0D5H8ZZ DESTRUCTION OF CECUM, VIA NATURAL OR ARTIFICIAL OPENING ENDOSCOPIC: ICD-10-PCS | Performed by: STUDENT IN AN ORGANIZED HEALTH CARE EDUCATION/TRAINING PROGRAM

## 2022-07-21 ENCOUNTER — HOSPITAL ENCOUNTER (OUTPATIENT)
Dept: HOSPITAL 95 - LAB SHORT | Age: 77
Discharge: HOME | End: 2022-07-21
Attending: INTERNAL MEDICINE
Payer: MEDICARE

## 2022-07-21 DIAGNOSIS — C85.10: Primary | ICD-10-CM

## 2022-07-21 LAB
ALBUMIN SERPL BCP-MCNC: 3.9 G/DL (ref 3.4–5)
ALBUMIN/GLOB SERPL: 1.7 {RATIO} (ref 0.8–1.8)
ALT SERPL W P-5'-P-CCNC: 16 U/L (ref 12–78)
ANION GAP SERPL CALCULATED.4IONS-SCNC: 4 MMOL/L (ref 6–16)
AST SERPL W P-5'-P-CCNC: 13 U/L (ref 12–37)
BILIRUB DIRECT SERPL-MCNC: 0.2 MG/DL (ref 0–0.3)
BILIRUB INDIRECT SERPL-MCNC: 0.7 MG/DL (ref 0.1–0.7)
BILIRUB SERPL-MCNC: 0.9 MG/DL (ref 0.1–1)
BUN SERPL-MCNC: 21 MG/DL (ref 8–24)
CALCIUM SERPL-MCNC: 8.6 MG/DL (ref 8.5–10.1)
CHLORIDE SERPL-SCNC: 111 MMOL/L (ref 98–108)
CO2 SERPL-SCNC: 26 MMOL/L (ref 21–32)
CREAT SERPL-MCNC: 1.11 MG/DL (ref 0.6–1.2)
GLOBULIN SER CALC-MCNC: 2.3 G/DL (ref 2.2–4)
GLUCOSE SERPL-MCNC: 97 MG/DL (ref 70–99)
LDH SERPL-CCNC: 182 U/L (ref 100–240)
POTASSIUM SERPL-SCNC: 4.5 MMOL/L (ref 3.5–5.5)
PROT SERPL-MCNC: 6.2 G/DL (ref 6.4–8.2)
SODIUM SERPL-SCNC: 141 MMOL/L (ref 136–145)

## 2022-09-07 ENCOUNTER — HOSPITAL ENCOUNTER (OUTPATIENT)
Dept: HOSPITAL 95 - LAB | Age: 77
Discharge: HOME | End: 2022-09-07
Attending: INTERNAL MEDICINE
Payer: MEDICARE

## 2022-09-07 DIAGNOSIS — C85.11: Primary | ICD-10-CM

## 2022-09-07 LAB
ALBUMIN SERPL BCP-MCNC: 3.5 G/DL (ref 3.4–5)
ALBUMIN/GLOB SERPL: 1.4 {RATIO} (ref 0.8–1.8)
ALT SERPL W P-5'-P-CCNC: 17 U/L (ref 12–78)
ANION GAP SERPL CALCULATED.4IONS-SCNC: 6 MMOL/L (ref 6–16)
AST SERPL W P-5'-P-CCNC: 7 U/L (ref 12–37)
BILIRUB SERPL-MCNC: 0.9 MG/DL (ref 0.1–1)
BUN SERPL-MCNC: 22 MG/DL (ref 8–24)
CALCIUM SERPL-MCNC: 8.4 MG/DL (ref 8.5–10.1)
CHLORIDE SERPL-SCNC: 106 MMOL/L (ref 98–108)
CO2 SERPL-SCNC: 28 MMOL/L (ref 21–32)
CREAT SERPL-MCNC: 1.03 MG/DL (ref 0.6–1.2)
GLOBULIN SER CALC-MCNC: 2.5 G/DL (ref 2.2–4)
GLUCOSE SERPL-MCNC: 117 MG/DL (ref 70–99)
LDH SERPL-CCNC: 147 U/L (ref 100–240)
PHOSPHATE SERPL-MCNC: 2.6 MG/DL (ref 2.5–4.9)
POTASSIUM SERPL-SCNC: 4.4 MMOL/L (ref 3.5–5.5)
PROT SERPL-MCNC: 6 G/DL (ref 6.4–8.2)
SODIUM SERPL-SCNC: 140 MMOL/L (ref 136–145)